# Patient Record
Sex: FEMALE | Race: WHITE | HISPANIC OR LATINO | Employment: PART TIME | URBAN - METROPOLITAN AREA
[De-identification: names, ages, dates, MRNs, and addresses within clinical notes are randomized per-mention and may not be internally consistent; named-entity substitution may affect disease eponyms.]

---

## 2023-05-04 PROBLEM — R60.0 PEDAL EDEMA: Status: ACTIVE | Noted: 2023-05-04

## 2023-05-04 PROBLEM — M54.50 LOW BACK PAIN: Status: ACTIVE | Noted: 2023-05-04

## 2023-05-04 PROBLEM — M25.562 LEFT KNEE PAIN: Status: ACTIVE | Noted: 2023-05-04

## 2023-05-04 PROBLEM — S83.241A TEAR OF MEDIAL MENISCUS OF RIGHT KNEE, CURRENT: Status: ACTIVE | Noted: 2023-05-04

## 2023-05-04 PROBLEM — F32.A DEPRESSION: Status: ACTIVE | Noted: 2023-05-04

## 2023-05-04 PROBLEM — K21.9 GERD (GASTROESOPHAGEAL REFLUX DISEASE): Status: ACTIVE | Noted: 2023-05-04

## 2023-05-04 PROBLEM — S89.92XA LEFT KNEE INJURY, INITIAL ENCOUNTER: Status: ACTIVE | Noted: 2023-05-04

## 2023-05-04 PROBLEM — M47.816 LUMBAR SPONDYLOSIS: Status: ACTIVE | Noted: 2023-05-04

## 2023-05-04 PROBLEM — I10 HTN (HYPERTENSION): Status: ACTIVE | Noted: 2023-05-04

## 2023-05-04 PROBLEM — E66.9 OBESITY: Status: ACTIVE | Noted: 2023-05-04

## 2023-05-04 PROBLEM — R00.2 PALPITATIONS: Status: ACTIVE | Noted: 2023-05-04

## 2023-05-04 RX ORDER — VENLAFAXINE HYDROCHLORIDE 75 MG/1
1 CAPSULE, EXTENDED RELEASE ORAL DAILY
COMMUNITY
Start: 2020-01-09 | End: 2023-05-18 | Stop reason: ALTCHOICE

## 2023-05-04 RX ORDER — FUROSEMIDE 20 MG/1
1 TABLET ORAL DAILY
COMMUNITY
Start: 2020-05-14 | End: 2023-05-18 | Stop reason: ALTCHOICE

## 2023-05-04 RX ORDER — OMEPRAZOLE 20 MG/1
1 CAPSULE, DELAYED RELEASE ORAL DAILY
COMMUNITY
Start: 2020-01-09 | End: 2023-05-18 | Stop reason: ALTCHOICE

## 2023-05-04 RX ORDER — HYDROCHLOROTHIAZIDE 25 MG/1
TABLET ORAL
COMMUNITY
Start: 2020-01-09 | End: 2023-05-18 | Stop reason: ALTCHOICE

## 2023-05-04 RX ORDER — MELOXICAM 15 MG/1
TABLET ORAL
COMMUNITY
Start: 2021-10-08 | End: 2023-05-18 | Stop reason: ALTCHOICE

## 2023-05-04 RX ORDER — HYDROCODONE BITARTRATE AND ACETAMINOPHEN 5; 325 MG/1; MG/1
TABLET ORAL EVERY 6 HOURS PRN
COMMUNITY
Start: 2021-01-25 | End: 2023-05-18 | Stop reason: ALTCHOICE

## 2023-05-18 ENCOUNTER — OFFICE VISIT (OUTPATIENT)
Dept: PRIMARY CARE | Facility: CLINIC | Age: 48
End: 2023-05-18
Payer: COMMERCIAL

## 2023-05-18 VITALS
DIASTOLIC BLOOD PRESSURE: 83 MMHG | BODY MASS INDEX: 41.81 KG/M2 | OXYGEN SATURATION: 96 % | SYSTOLIC BLOOD PRESSURE: 138 MMHG | HEART RATE: 75 BPM | WEIGHT: 236 LBS

## 2023-05-18 DIAGNOSIS — I10 PRIMARY HYPERTENSION: ICD-10-CM

## 2023-05-18 DIAGNOSIS — E66.01 CLASS 3 SEVERE OBESITY WITHOUT SERIOUS COMORBIDITY WITH BODY MASS INDEX (BMI) OF 40.0 TO 44.9 IN ADULT, UNSPECIFIED OBESITY TYPE (MULTI): ICD-10-CM

## 2023-05-18 DIAGNOSIS — Z86.39 HISTORY OF HYPERLIPIDEMIA: ICD-10-CM

## 2023-05-18 DIAGNOSIS — M79.89 LEG SWELLING: Primary | ICD-10-CM

## 2023-05-18 DIAGNOSIS — R60.0 PEDAL EDEMA: ICD-10-CM

## 2023-05-18 PROCEDURE — 3008F BODY MASS INDEX DOCD: CPT | Performed by: STUDENT IN AN ORGANIZED HEALTH CARE EDUCATION/TRAINING PROGRAM

## 2023-05-18 PROCEDURE — 3079F DIAST BP 80-89 MM HG: CPT | Performed by: STUDENT IN AN ORGANIZED HEALTH CARE EDUCATION/TRAINING PROGRAM

## 2023-05-18 PROCEDURE — 99203 OFFICE O/P NEW LOW 30 MIN: CPT | Performed by: STUDENT IN AN ORGANIZED HEALTH CARE EDUCATION/TRAINING PROGRAM

## 2023-05-18 PROCEDURE — 3075F SYST BP GE 130 - 139MM HG: CPT | Performed by: STUDENT IN AN ORGANIZED HEALTH CARE EDUCATION/TRAINING PROGRAM

## 2023-05-18 RX ORDER — HYDROCHLOROTHIAZIDE 12.5 MG/1
12.5 TABLET ORAL DAILY
Qty: 30 TABLET | Refills: 1 | Status: SHIPPED | OUTPATIENT
Start: 2023-05-18 | End: 2023-06-13

## 2023-05-18 ASSESSMENT — ENCOUNTER SYMPTOMS
VOMITING: 0
UNEXPECTED WEIGHT CHANGE: 0
SHORTNESS OF BREATH: 0
CONSTIPATION: 0
NAUSEA: 0
ABDOMINAL PAIN: 0
COLOR CHANGE: 0
CHILLS: 0
MUSCULOSKELETAL NEGATIVE: 1
COUGH: 0
CONFUSION: 0
PALPITATIONS: 0
DIARRHEA: 0
DIZZINESS: 0
HEADACHES: 0
WHEEZING: 0
FATIGUE: 0
FEVER: 0

## 2023-05-18 ASSESSMENT — PAIN SCALES - GENERAL: PAINLEVEL: 0-NO PAIN

## 2023-05-18 ASSESSMENT — PATIENT HEALTH QUESTIONNAIRE - PHQ9
10. IF YOU CHECKED OFF ANY PROBLEMS, HOW DIFFICULT HAVE THESE PROBLEMS MADE IT FOR YOU TO DO YOUR WORK, TAKE CARE OF THINGS AT HOME, OR GET ALONG WITH OTHER PEOPLE: NOT DIFFICULT AT ALL
SUM OF ALL RESPONSES TO PHQ9 QUESTIONS 1 AND 2: 1
1. LITTLE INTEREST OR PLEASURE IN DOING THINGS: NOT AT ALL
2. FEELING DOWN, DEPRESSED OR HOPELESS: SEVERAL DAYS

## 2023-05-18 NOTE — PROGRESS NOTES
Subjective   Patient ID: Princess Long is a 48 y.o. female who presents for New Patient Visit (Pt is here for Npv, would like to estab care, seen Rylee before. Pt says she swells this was addressed before but didn't figure out the cause of the swelling, mostly in the left leg, pt has picture of the feet swelling.). LOV 3 yrs ago.     HPI   She is here to establish care and also complaining of bilateral leg swelling.  Reports she has been having on and off lower extremity edema for over a year, had some work-up in the past with normal results.  She denies calf pain, pain at the popliteal site and previous history of blood clots.  She also denies chest pain/shortness of breath, abdominal pain, nausea vomiting, cough and other associated symptoms.  Reports she has a history of hypertension, previously was not HCTZ but stopped as she was unable to see the doctor over 3 years.    Review of Systems   Constitutional:  Negative for chills, fatigue, fever and unexpected weight change.   HENT: Negative.     Respiratory:  Negative for cough, shortness of breath and wheezing.    Cardiovascular:  Positive for leg swelling. Negative for chest pain and palpitations.   Gastrointestinal:  Negative for abdominal pain, constipation, diarrhea, nausea and vomiting.   Musculoskeletal: Negative.    Skin:  Negative for color change and rash.   Neurological:  Negative for dizziness and headaches.   Psychiatric/Behavioral:  Negative for behavioral problems and confusion.        Objective   /83 (BP Location: Left arm, Patient Position: Sitting, BP Cuff Size: Adult)   Pulse 75   Wt 107 kg (236 lb)   SpO2 96%   BMI 41.81 kg/m²     Physical Exam  Vitals and nursing note reviewed.   Constitutional:       Appearance: Normal appearance. She is obese.   Cardiovascular:      Rate and Rhythm: Normal rate and regular rhythm.      Pulses: Normal pulses.      Heart sounds: Normal heart sounds.   Pulmonary:      Effort: Pulmonary effort is  normal. No respiratory distress.      Breath sounds: Normal breath sounds. No wheezing.   Abdominal:      General: Abdomen is flat. Bowel sounds are normal.      Palpations: Abdomen is soft.   Musculoskeletal:         General: Normal range of motion.      Right lower leg: Edema present.      Left lower leg: Edema present.      Comments: Has trace-1+ pitting edema bl up to the mid shin. Not tttp at the calf/popliteal are. Gait normal.    Neurological:      General: No focal deficit present.      Mental Status: She is alert and oriented to person, place, and time.   Psychiatric:         Mood and Affect: Mood normal.         Behavior: Behavior normal.       Assessment/Plan   She is here to establish care and also complaining of bilateral leg swelling.  Appears that this is a having intermittent bilateral leg edema for over a years, no calf/popliteal area pain and also no dyspnea on exertion.  She likely has dependent edema versus other etiologies as cardiac/renal, although less likely in absence of other symptoms.  We will obtain blood work as CBC, CMP, TSH, BNP and others as listed below to eval further. Pending BW result for further mgmt.   We will restart back on hydrochlorothiazide for HTN; start 12.5 mg daily and may help with swelling as well. Optimize as indicated at NOV. Rec dash diet, heart healthy diet. Also put referral to see nutritionist for obesity mgmt. May consider adding wt loss meds at NOV if interested. Otherwise clinically stable.   Problem List Items Addressed This Visit          Circulatory    HTN (hypertension)    Relevant Medications    hydroCHLOROthiazide (HYDRODiuril) 12.5 mg tablet    Other Relevant Orders    Comprehensive Metabolic Panel       Musculoskeletal    Pedal edema       Endocrine/Metabolic    Obesity    Relevant Orders    Comprehensive Metabolic Panel    Referral to Nutrition Services     Other Visit Diagnoses       Leg swelling    -  Primary    Relevant Orders    CBC     Comprehensive Metabolic Panel    Tsh With Reflex To Free T4 If Abnormal    B-type natriuretic peptide    History of hyperlipidemia        Relevant Orders    Lipid Panel          Rtc 3 mo for physical/FU.    Jesus Cabello MD    [facility]

## 2023-05-25 ENCOUNTER — LAB (OUTPATIENT)
Dept: LAB | Facility: LAB | Age: 48
End: 2023-05-25
Payer: COMMERCIAL

## 2023-05-25 DIAGNOSIS — Z86.39 HISTORY OF HYPERLIPIDEMIA: ICD-10-CM

## 2023-05-25 DIAGNOSIS — M79.89 LEG SWELLING: ICD-10-CM

## 2023-05-25 DIAGNOSIS — I10 PRIMARY HYPERTENSION: ICD-10-CM

## 2023-05-25 DIAGNOSIS — E66.01 CLASS 3 SEVERE OBESITY WITHOUT SERIOUS COMORBIDITY WITH BODY MASS INDEX (BMI) OF 40.0 TO 44.9 IN ADULT, UNSPECIFIED OBESITY TYPE (MULTI): ICD-10-CM

## 2023-05-25 LAB
ALANINE AMINOTRANSFERASE (SGPT) (U/L) IN SER/PLAS: 34 U/L (ref 7–45)
ALBUMIN (G/DL) IN SER/PLAS: 4.2 G/DL (ref 3.4–5)
ALKALINE PHOSPHATASE (U/L) IN SER/PLAS: 61 U/L (ref 33–110)
ANION GAP IN SER/PLAS: 13 MMOL/L (ref 10–20)
ASPARTATE AMINOTRANSFERASE (SGOT) (U/L) IN SER/PLAS: 39 U/L (ref 9–39)
BILIRUBIN TOTAL (MG/DL) IN SER/PLAS: 0.5 MG/DL (ref 0–1.2)
CALCIUM (MG/DL) IN SER/PLAS: 9.3 MG/DL (ref 8.6–10.3)
CARBON DIOXIDE, TOTAL (MMOL/L) IN SER/PLAS: 26 MMOL/L (ref 21–32)
CHLORIDE (MMOL/L) IN SER/PLAS: 104 MMOL/L (ref 98–107)
CHOLESTEROL (MG/DL) IN SER/PLAS: 197 MG/DL (ref 0–199)
CHOLESTEROL IN HDL (MG/DL) IN SER/PLAS: 61.1 MG/DL
CHOLESTEROL/HDL RATIO: 3.2
CREATININE (MG/DL) IN SER/PLAS: 0.69 MG/DL (ref 0.5–1.05)
ERYTHROCYTE DISTRIBUTION WIDTH (RATIO) BY AUTOMATED COUNT: 12.5 % (ref 11.5–14.5)
ERYTHROCYTE MEAN CORPUSCULAR HEMOGLOBIN CONCENTRATION (G/DL) BY AUTOMATED: 32 G/DL (ref 32–36)
ERYTHROCYTE MEAN CORPUSCULAR VOLUME (FL) BY AUTOMATED COUNT: 94 FL (ref 80–100)
ERYTHROCYTES (10*6/UL) IN BLOOD BY AUTOMATED COUNT: 4.6 X10E12/L (ref 4–5.2)
GFR FEMALE: >90 ML/MIN/1.73M2
GLUCOSE (MG/DL) IN SER/PLAS: 109 MG/DL (ref 74–99)
HEMATOCRIT (%) IN BLOOD BY AUTOMATED COUNT: 43.1 % (ref 36–46)
HEMOGLOBIN (G/DL) IN BLOOD: 13.8 G/DL (ref 12–16)
LDL: 113 MG/DL (ref 0–99)
LEUKOCYTES (10*3/UL) IN BLOOD BY AUTOMATED COUNT: 6.5 X10E9/L (ref 4.4–11.3)
NATRIURETIC PEPTIDE B (PG/ML) IN SER/PLAS: 27 PG/ML (ref 0–99)
PLATELETS (10*3/UL) IN BLOOD AUTOMATED COUNT: 279 X10E9/L (ref 150–450)
POTASSIUM (MMOL/L) IN SER/PLAS: 3.8 MMOL/L (ref 3.5–5.3)
PROTEIN TOTAL: 7 G/DL (ref 6.4–8.2)
SODIUM (MMOL/L) IN SER/PLAS: 139 MMOL/L (ref 136–145)
THYROTROPIN (MIU/L) IN SER/PLAS BY DETECTION LIMIT <= 0.05 MIU/L: 3.53 MIU/L (ref 0.44–3.98)
TRIGLYCERIDE (MG/DL) IN SER/PLAS: 117 MG/DL (ref 0–149)
UREA NITROGEN (MG/DL) IN SER/PLAS: 12 MG/DL (ref 6–23)
VLDL: 23 MG/DL (ref 0–40)

## 2023-05-25 PROCEDURE — 83880 ASSAY OF NATRIURETIC PEPTIDE: CPT

## 2023-05-25 PROCEDURE — 84443 ASSAY THYROID STIM HORMONE: CPT

## 2023-05-25 PROCEDURE — 36415 COLL VENOUS BLD VENIPUNCTURE: CPT

## 2023-05-25 PROCEDURE — 80061 LIPID PANEL: CPT

## 2023-05-25 PROCEDURE — 80053 COMPREHEN METABOLIC PANEL: CPT

## 2023-05-25 PROCEDURE — 85027 COMPLETE CBC AUTOMATED: CPT

## 2023-05-30 ENCOUNTER — TELEPHONE (OUTPATIENT)
Dept: PRIMARY CARE | Facility: CLINIC | Age: 48
End: 2023-05-30
Payer: COMMERCIAL

## 2023-05-30 NOTE — TELEPHONE ENCOUNTER
Pt states she has not started the hydrochlorothiazide yet because she read the pamphlet that came with it.  It states she should not be in the sun while taking.  Pt works in the sun all day every day and wonders if you have any advise about this.

## 2023-05-30 NOTE — TELEPHONE ENCOUNTER
PATIENT CALLED BACK/ READ DR CLARK MESSAGE/     PATIENT UNDERSTANDS, AND WILL CALL BACK IF THERE IS ANY CONCERNS AFTER STARTING THE MEDS AS PRESCRIBED AS

## 2023-06-13 DIAGNOSIS — I10 PRIMARY HYPERTENSION: Primary | ICD-10-CM

## 2023-06-13 PROBLEM — S71.112A: Status: ACTIVE | Noted: 2023-06-13

## 2023-06-13 RX ORDER — OMEPRAZOLE 20 MG/1
1 CAPSULE, DELAYED RELEASE ORAL DAILY
COMMUNITY
Start: 2020-01-09 | End: 2023-07-06 | Stop reason: ENTERED-IN-ERROR

## 2023-06-13 RX ORDER — HYDROCHLOROTHIAZIDE 12.5 MG/1
TABLET ORAL
Qty: 30 TABLET | Refills: 1 | Status: SHIPPED | OUTPATIENT
Start: 2023-06-13 | End: 2023-07-06 | Stop reason: SDUPTHER

## 2023-06-13 RX ORDER — FUROSEMIDE 20 MG/1
1 TABLET ORAL DAILY
COMMUNITY
Start: 2020-05-14 | End: 2023-07-06 | Stop reason: ALTCHOICE

## 2023-06-13 RX ORDER — VENLAFAXINE HYDROCHLORIDE 75 MG/1
1 CAPSULE, EXTENDED RELEASE ORAL DAILY
COMMUNITY
Start: 2020-01-09 | End: 2023-07-06 | Stop reason: ALTCHOICE

## 2023-06-13 RX ORDER — MELOXICAM 15 MG/1
TABLET ORAL
COMMUNITY
Start: 2021-10-08 | End: 2023-07-06 | Stop reason: ALTCHOICE

## 2023-07-06 ENCOUNTER — OFFICE VISIT (OUTPATIENT)
Dept: PRIMARY CARE | Facility: CLINIC | Age: 48
End: 2023-07-06
Payer: COMMERCIAL

## 2023-07-06 VITALS
SYSTOLIC BLOOD PRESSURE: 150 MMHG | RESPIRATION RATE: 16 BRPM | HEART RATE: 84 BPM | BODY MASS INDEX: 41.46 KG/M2 | OXYGEN SATURATION: 97 % | DIASTOLIC BLOOD PRESSURE: 94 MMHG | TEMPERATURE: 97.5 F | HEIGHT: 63 IN | WEIGHT: 234 LBS

## 2023-07-06 DIAGNOSIS — I10 PRIMARY HYPERTENSION: ICD-10-CM

## 2023-07-06 DIAGNOSIS — K21.9 GASTROESOPHAGEAL REFLUX DISEASE WITHOUT ESOPHAGITIS: ICD-10-CM

## 2023-07-06 DIAGNOSIS — M79.89 LEG SWELLING: Primary | ICD-10-CM

## 2023-07-06 DIAGNOSIS — R60.0 PEDAL EDEMA: ICD-10-CM

## 2023-07-06 PROCEDURE — 3080F DIAST BP >= 90 MM HG: CPT | Performed by: STUDENT IN AN ORGANIZED HEALTH CARE EDUCATION/TRAINING PROGRAM

## 2023-07-06 PROCEDURE — 99214 OFFICE O/P EST MOD 30 MIN: CPT | Performed by: STUDENT IN AN ORGANIZED HEALTH CARE EDUCATION/TRAINING PROGRAM

## 2023-07-06 PROCEDURE — 3077F SYST BP >= 140 MM HG: CPT | Performed by: STUDENT IN AN ORGANIZED HEALTH CARE EDUCATION/TRAINING PROGRAM

## 2023-07-06 PROCEDURE — 3008F BODY MASS INDEX DOCD: CPT | Performed by: STUDENT IN AN ORGANIZED HEALTH CARE EDUCATION/TRAINING PROGRAM

## 2023-07-06 RX ORDER — OMEPRAZOLE 20 MG/1
20 CAPSULE, DELAYED RELEASE ORAL
Qty: 30 CAPSULE | Refills: 5 | Status: SHIPPED | OUTPATIENT
Start: 2023-07-06 | End: 2024-01-02

## 2023-07-06 RX ORDER — HYDROCHLOROTHIAZIDE 25 MG/1
25 TABLET ORAL DAILY
Qty: 90 TABLET | Refills: 1 | Status: SHIPPED | OUTPATIENT
Start: 2023-07-06 | End: 2024-01-25

## 2023-07-06 ASSESSMENT — LIFESTYLE VARIABLES
HOW OFTEN DO YOU HAVE A DRINK CONTAINING ALCOHOL: NEVER
HOW MANY STANDARD DRINKS CONTAINING ALCOHOL DO YOU HAVE ON A TYPICAL DAY: PATIENT DOES NOT DRINK
HOW OFTEN DO YOU HAVE SIX OR MORE DRINKS ON ONE OCCASION: NEVER
AUDIT-C TOTAL SCORE: 0
SKIP TO QUESTIONS 9-10: 1

## 2023-07-06 ASSESSMENT — ENCOUNTER SYMPTOMS
COUGH: 0
SHORTNESS OF BREATH: 0
HEADACHES: 0
MUSCULOSKELETAL NEGATIVE: 1
CONSTIPATION: 0
FATIGUE: 0
NAUSEA: 0
WHEEZING: 0
VOMITING: 0
DIARRHEA: 0
CONFUSION: 0
FEVER: 0
ABDOMINAL PAIN: 0
DIZZINESS: 0
PALPITATIONS: 0
CHILLS: 0
UNEXPECTED WEIGHT CHANGE: 0
COLOR CHANGE: 0

## 2023-07-06 NOTE — PROGRESS NOTES
"Subjective   Patient ID: Porfirio Long is a 48 y.o. female who presents for Follow-up, Hypertension, and Edema (Bilateral leg swelling.).    HPI   She is here for FU visit. Reports she is doing well, no acute issues today except continues to have bl leg swelling. Denies pain on her calf, CP/SOB, palpitation, HA, cough and other asso sx. Reports on & off swelling for quite some times. Recent BW (05/25/23) lipid, BNP, TSH, CMP & CBC were all wnl.     # HTN   - io BP was 150/94   - takes hydrochlorothiazide 12.5 mg daily     # GERD   - reports stable sx on PPI 20 mg daily.     Review of Systems   Constitutional:  Negative for chills, fatigue, fever and unexpected weight change.   HENT: Negative.     Respiratory:  Negative for cough, shortness of breath and wheezing.    Cardiovascular:  Positive for leg swelling. Negative for chest pain and palpitations.   Gastrointestinal:  Negative for abdominal pain, constipation, diarrhea, nausea and vomiting.   Musculoskeletal: Negative.    Skin:  Negative for color change and rash.   Neurological:  Negative for dizziness and headaches.   Psychiatric/Behavioral:  Negative for behavioral problems and confusion.        Objective   BP (!) 150/94 (BP Location: Right arm, Patient Position: Sitting, BP Cuff Size: Large adult)   Pulse 84   Temp 36.4 °C (97.5 °F)   Resp 16   Ht 1.6 m (5' 3\")   Wt 106 kg (234 lb)   SpO2 97%   BMI 41.45 kg/m²     Physical Exam  Vitals and nursing note reviewed.   Constitutional:       Appearance: Normal appearance. She is obese.   Cardiovascular:      Rate and Rhythm: Normal rate and regular rhythm.      Pulses: Normal pulses.      Heart sounds: Normal heart sounds.   Pulmonary:      Effort: Pulmonary effort is normal. No respiratory distress.      Breath sounds: Normal breath sounds.   Abdominal:      General: Abdomen is flat. Bowel sounds are normal.      Palpations: Abdomen is soft.   Musculoskeletal:         General: Normal range of motion.      " Right lower leg: Edema present.      Left lower leg: Edema present.   Neurological:      General: No focal deficit present.      Mental Status: She is alert and oriented to person, place, and time.   Psychiatric:         Mood and Affect: Mood normal.         Behavior: Behavior normal.       Assessment/Plan   She is here for FU visit. She is having ongoing bl leg swelling; had normal BW recently (05/25/23) as listed above. Likely dependent edema vs other vascular issues; will obtain bl duplex US of LE; also place referral to see VS post US result. HTN remains sl under control; will go up on hydrochlorothiazide to 25 mg daily which may help with swelling too. GERD been stable, cont PPI as listed below.    Problem List Items Addressed This Visit          Cardiac and Vasculature    HTN (hypertension)    Relevant Medications    hydroCHLOROthiazide (HYDRODiuril) 25 mg tablet       Gastrointestinal and Abdominal    GERD (gastroesophageal reflux disease)    Relevant Medications    omeprazole (PriLOSEC) 20 mg DR capsule       Symptoms and Signs    Pedal edema    Relevant Orders    Vascular US lower extremity venous duplex bilateral     Other Visit Diagnoses       Leg swelling    -  Primary    Relevant Orders    Vascular US lower extremity venous duplex bilateral    Referral to Vascular Surgery          Rtc 2 mo for HTN/other FU   Jesus Cabello MD    [facility]

## 2023-09-19 ENCOUNTER — APPOINTMENT (OUTPATIENT)
Dept: PRIMARY CARE | Facility: CLINIC | Age: 48
End: 2023-09-19
Payer: COMMERCIAL

## 2023-10-10 ENCOUNTER — APPOINTMENT (OUTPATIENT)
Dept: PRIMARY CARE | Facility: CLINIC | Age: 48
End: 2023-10-10
Payer: COMMERCIAL

## 2024-01-24 ENCOUNTER — TELEPHONE (OUTPATIENT)
Dept: PRIMARY CARE | Facility: CLINIC | Age: 49
End: 2024-01-24

## 2024-01-24 DIAGNOSIS — I10 PRIMARY HYPERTENSION: ICD-10-CM

## 2024-01-25 RX ORDER — HYDROCHLOROTHIAZIDE 25 MG/1
25 TABLET ORAL DAILY
Qty: 30 TABLET | Refills: 1 | Status: SHIPPED | OUTPATIENT
Start: 2024-01-25 | End: 2024-02-19

## 2024-02-17 DIAGNOSIS — I10 PRIMARY HYPERTENSION: ICD-10-CM

## 2024-02-19 RX ORDER — HYDROCHLOROTHIAZIDE 25 MG/1
25 TABLET ORAL DAILY
Qty: 90 TABLET | Refills: 0 | Status: SHIPPED | OUTPATIENT
Start: 2024-02-19

## 2024-03-14 ENCOUNTER — APPOINTMENT (OUTPATIENT)
Dept: PRIMARY CARE | Facility: CLINIC | Age: 49
End: 2024-03-14
Payer: COMMERCIAL

## 2024-03-14 PROBLEM — S71.112A: Status: RESOLVED | Noted: 2023-06-13 | Resolved: 2024-03-14

## 2024-03-14 PROBLEM — F32.A DEPRESSION: Status: RESOLVED | Noted: 2023-05-04 | Resolved: 2024-03-14

## 2024-03-14 PROBLEM — S83.241A TEAR OF MEDIAL MENISCUS OF RIGHT KNEE, CURRENT: Status: RESOLVED | Noted: 2023-05-04 | Resolved: 2024-03-14

## 2024-03-14 PROBLEM — S89.92XA LEFT KNEE INJURY, INITIAL ENCOUNTER: Status: RESOLVED | Noted: 2023-05-04 | Resolved: 2024-03-14

## 2024-03-14 PROBLEM — M25.562 LEFT KNEE PAIN: Status: RESOLVED | Noted: 2023-05-04 | Resolved: 2024-03-14

## 2025-04-01 ENCOUNTER — APPOINTMENT (OUTPATIENT)
Dept: RADIOLOGY | Facility: HOSPITAL | Age: 50
End: 2025-04-01

## 2025-04-01 ENCOUNTER — HOSPITAL ENCOUNTER (EMERGENCY)
Facility: HOSPITAL | Age: 50
Discharge: HOME | End: 2025-04-01
Attending: EMERGENCY MEDICINE

## 2025-04-01 VITALS
WEIGHT: 235 LBS | DIASTOLIC BLOOD PRESSURE: 99 MMHG | RESPIRATION RATE: 20 BRPM | HEIGHT: 63 IN | HEART RATE: 76 BPM | BODY MASS INDEX: 41.64 KG/M2 | OXYGEN SATURATION: 98 % | TEMPERATURE: 97.6 F | SYSTOLIC BLOOD PRESSURE: 145 MMHG

## 2025-04-01 DIAGNOSIS — R10.11 RIGHT UPPER QUADRANT ABDOMINAL PAIN: Primary | ICD-10-CM

## 2025-04-01 LAB
ALBUMIN SERPL BCP-MCNC: 4.1 G/DL (ref 3.4–5)
ALP SERPL-CCNC: 51 U/L (ref 33–110)
ALT SERPL W P-5'-P-CCNC: 35 U/L (ref 7–45)
ANION GAP SERPL CALC-SCNC: 12 MMOL/L (ref 10–20)
APPEARANCE UR: CLEAR
AST SERPL W P-5'-P-CCNC: 31 U/L (ref 9–39)
BASOPHILS # BLD AUTO: 0.02 X10*3/UL (ref 0–0.1)
BASOPHILS NFR BLD AUTO: 0.3 %
BILIRUB DIRECT SERPL-MCNC: 0.1 MG/DL (ref 0–0.3)
BILIRUB SERPL-MCNC: 0.6 MG/DL (ref 0–1.2)
BILIRUB UR STRIP.AUTO-MCNC: NEGATIVE MG/DL
BUN SERPL-MCNC: 7 MG/DL (ref 6–23)
CALCIUM SERPL-MCNC: 9.1 MG/DL (ref 8.6–10.3)
CHLORIDE SERPL-SCNC: 105 MMOL/L (ref 98–107)
CO2 SERPL-SCNC: 26 MMOL/L (ref 21–32)
COLOR UR: NORMAL
CREAT SERPL-MCNC: 0.6 MG/DL (ref 0.5–1.05)
EGFRCR SERPLBLD CKD-EPI 2021: >90 ML/MIN/1.73M*2
EOSINOPHIL # BLD AUTO: 0.12 X10*3/UL (ref 0–0.7)
EOSINOPHIL NFR BLD AUTO: 1.6 %
ERYTHROCYTE [DISTWIDTH] IN BLOOD BY AUTOMATED COUNT: 12.2 % (ref 11.5–14.5)
GLUCOSE SERPL-MCNC: 144 MG/DL (ref 74–99)
GLUCOSE UR STRIP.AUTO-MCNC: NORMAL MG/DL
HCT VFR BLD AUTO: 43.6 % (ref 36–46)
HGB BLD-MCNC: 14.5 G/DL (ref 12–16)
IMM GRANULOCYTES # BLD AUTO: 0.02 X10*3/UL (ref 0–0.7)
IMM GRANULOCYTES NFR BLD AUTO: 0.3 % (ref 0–0.9)
KETONES UR STRIP.AUTO-MCNC: NEGATIVE MG/DL
LACTATE SERPL-SCNC: 1.4 MMOL/L (ref 0.4–2)
LEUKOCYTE ESTERASE UR QL STRIP.AUTO: NEGATIVE
LIPASE SERPL-CCNC: 34 U/L (ref 9–82)
LYMPHOCYTES # BLD AUTO: 2.37 X10*3/UL (ref 1.2–4.8)
LYMPHOCYTES NFR BLD AUTO: 31.3 %
MCH RBC QN AUTO: 29.7 PG (ref 26–34)
MCHC RBC AUTO-ENTMCNC: 33.3 G/DL (ref 32–36)
MCV RBC AUTO: 89 FL (ref 80–100)
MONOCYTES # BLD AUTO: 0.43 X10*3/UL (ref 0.1–1)
MONOCYTES NFR BLD AUTO: 5.7 %
NEUTROPHILS # BLD AUTO: 4.62 X10*3/UL (ref 1.2–7.7)
NEUTROPHILS NFR BLD AUTO: 60.8 %
NITRITE UR QL STRIP.AUTO: NEGATIVE
NRBC BLD-RTO: 0 /100 WBCS (ref 0–0)
PH UR STRIP.AUTO: 6 [PH]
PLATELET # BLD AUTO: 285 X10*3/UL (ref 150–450)
POTASSIUM SERPL-SCNC: 4 MMOL/L (ref 3.5–5.3)
PROT SERPL-MCNC: 6.9 G/DL (ref 6.4–8.2)
PROT UR STRIP.AUTO-MCNC: NEGATIVE MG/DL
RBC # BLD AUTO: 4.89 X10*6/UL (ref 4–5.2)
RBC # UR STRIP.AUTO: NEGATIVE MG/DL
SODIUM SERPL-SCNC: 139 MMOL/L (ref 136–145)
SP GR UR STRIP.AUTO: 1.02
UROBILINOGEN UR STRIP.AUTO-MCNC: NORMAL MG/DL
WBC # BLD AUTO: 7.6 X10*3/UL (ref 4.4–11.3)

## 2025-04-01 PROCEDURE — 2550000001 HC RX 255 CONTRASTS: Performed by: EMERGENCY MEDICINE

## 2025-04-01 PROCEDURE — 96361 HYDRATE IV INFUSION ADD-ON: CPT

## 2025-04-01 PROCEDURE — 76705 ECHO EXAM OF ABDOMEN: CPT

## 2025-04-01 PROCEDURE — 81003 URINALYSIS AUTO W/O SCOPE: CPT | Performed by: EMERGENCY MEDICINE

## 2025-04-01 PROCEDURE — 99285 EMERGENCY DEPT VISIT HI MDM: CPT | Mod: 25 | Performed by: EMERGENCY MEDICINE

## 2025-04-01 PROCEDURE — 96375 TX/PRO/DX INJ NEW DRUG ADDON: CPT

## 2025-04-01 PROCEDURE — 2500000004 HC RX 250 GENERAL PHARMACY W/ HCPCS (ALT 636 FOR OP/ED): Mod: JZ | Performed by: EMERGENCY MEDICINE

## 2025-04-01 PROCEDURE — 83690 ASSAY OF LIPASE: CPT | Performed by: EMERGENCY MEDICINE

## 2025-04-01 PROCEDURE — 82248 BILIRUBIN DIRECT: CPT | Performed by: EMERGENCY MEDICINE

## 2025-04-01 PROCEDURE — 96374 THER/PROPH/DIAG INJ IV PUSH: CPT | Mod: 59

## 2025-04-01 PROCEDURE — 80051 ELECTROLYTE PANEL: CPT | Performed by: EMERGENCY MEDICINE

## 2025-04-01 PROCEDURE — 76705 ECHO EXAM OF ABDOMEN: CPT | Performed by: RADIOLOGY

## 2025-04-01 PROCEDURE — 83605 ASSAY OF LACTIC ACID: CPT | Performed by: EMERGENCY MEDICINE

## 2025-04-01 PROCEDURE — 36415 COLL VENOUS BLD VENIPUNCTURE: CPT | Performed by: EMERGENCY MEDICINE

## 2025-04-01 PROCEDURE — 85025 COMPLETE CBC W/AUTO DIFF WBC: CPT | Performed by: EMERGENCY MEDICINE

## 2025-04-01 PROCEDURE — 74177 CT ABD & PELVIS W/CONTRAST: CPT

## 2025-04-01 RX ORDER — ONDANSETRON HYDROCHLORIDE 2 MG/ML
4 INJECTION, SOLUTION INTRAVENOUS ONCE
Status: COMPLETED | OUTPATIENT
Start: 2025-04-01 | End: 2025-04-01

## 2025-04-01 RX ORDER — KETOROLAC TROMETHAMINE 30 MG/ML
30 INJECTION, SOLUTION INTRAMUSCULAR; INTRAVENOUS ONCE
Status: COMPLETED | OUTPATIENT
Start: 2025-04-01 | End: 2025-04-01

## 2025-04-01 RX ADMIN — SODIUM CHLORIDE 1000 ML: 0.9 INJECTION, SOLUTION INTRAVENOUS at 14:35

## 2025-04-01 RX ADMIN — IOHEXOL 75 ML: 350 INJECTION, SOLUTION INTRAVENOUS at 17:19

## 2025-04-01 RX ADMIN — HYDROMORPHONE HYDROCHLORIDE 0.5 MG: 0.5 INJECTION, SOLUTION INTRAMUSCULAR; INTRAVENOUS; SUBCUTANEOUS at 14:40

## 2025-04-01 RX ADMIN — KETOROLAC TROMETHAMINE 30 MG: 30 INJECTION, SOLUTION INTRAMUSCULAR at 14:40

## 2025-04-01 RX ADMIN — ONDANSETRON 4 MG: 2 INJECTION INTRAMUSCULAR; INTRAVENOUS at 14:40

## 2025-04-01 ASSESSMENT — LIFESTYLE VARIABLES
EVER FELT BAD OR GUILTY ABOUT YOUR DRINKING: NO
HAVE PEOPLE ANNOYED YOU BY CRITICIZING YOUR DRINKING: NO
TOTAL SCORE: 0
HAVE YOU EVER FELT YOU SHOULD CUT DOWN ON YOUR DRINKING: NO
EVER HAD A DRINK FIRST THING IN THE MORNING TO STEADY YOUR NERVES TO GET RID OF A HANGOVER: NO

## 2025-04-01 ASSESSMENT — COLUMBIA-SUICIDE SEVERITY RATING SCALE - C-SSRS
2. HAVE YOU ACTUALLY HAD ANY THOUGHTS OF KILLING YOURSELF?: NO
1. IN THE PAST MONTH, HAVE YOU WISHED YOU WERE DEAD OR WISHED YOU COULD GO TO SLEEP AND NOT WAKE UP?: NO
6. HAVE YOU EVER DONE ANYTHING, STARTED TO DO ANYTHING, OR PREPARED TO DO ANYTHING TO END YOUR LIFE?: NO

## 2025-04-01 ASSESSMENT — PAIN DESCRIPTION - ORIENTATION: ORIENTATION: RIGHT;UPPER

## 2025-04-01 ASSESSMENT — PAIN DESCRIPTION - PAIN TYPE: TYPE: ACUTE PAIN

## 2025-04-01 ASSESSMENT — PAIN - FUNCTIONAL ASSESSMENT: PAIN_FUNCTIONAL_ASSESSMENT: 0-10

## 2025-04-01 ASSESSMENT — PAIN SCALES - GENERAL: PAINLEVEL_OUTOF10: 10 - WORST POSSIBLE PAIN

## 2025-04-01 ASSESSMENT — PAIN DESCRIPTION - LOCATION: LOCATION: ABDOMEN

## 2025-04-01 ASSESSMENT — PAIN DESCRIPTION - DESCRIPTORS: DESCRIPTORS: SHARP

## 2025-04-01 NOTE — ED TRIAGE NOTES
Patient states that she had abd pain in her right upper abd that started last night, she states when she takes a breath it hurts in her upper back. She denies any n/v diarrhea.

## 2025-04-01 NOTE — ED PROVIDER NOTES
HPI   Chief Complaint   Patient presents with   • Abdominal Pain       HPI: []  50-year-old white female history of tobacco use, hypertension comes in with abdominal pain for the last 24 hours.  Pain localized right mid/upper abdomen, with associated nausea and vomiting.  Denies diarrhea constipation denies any fever chills denies any chest pain pressure heaviness denies cough or congestion denies urinary frequency urgency materia denies hematemesis melena or hematochezia no hemoptysis no recent travel hospitalization or antibiotic use.    Past history: Hypertension, tobacco use  Social: Patient is a smoker denies Alcortin A drug abuse  REVIEW OF SYSTEMS:    GENERAL.: No weight loss, fatigue, anorexia, insomnia, fever.    EYES: No vision loss, double vision, drainage, eye pain.    ENT: No pharyngitis, dry mouth.    CARDIOPULMONARY: No chest pain, palpitations, syncope, near syncope. No shortness of breath, cough, hemoptysis.    GI: Positive for abdominal pain, change in bowel habits, melena, hematemesis, hematochezia, nausea, vomiting, diarrhea.    : No discharge, dysuria, frequency, urgency, hematuria.    MS: No limb pain, joint pain, joint swelling.    SKIN: No rashes.    PSYCH: No depression, anxiety, suicidality, homicidality.    Review of systems is otherwise negative unless stated above or in history of present illness.  Social history, family history, allergies reviewed.  PHYSICAL EXAM:    GENERAL: Vitals noted, no distress. Alert and oriented  x 3. Non-toxic.  Appears uncomfortable    EENT: TMs clear. Posterior oropharynx unremarkable. No meningismus. No LAD.     NECK: Supple. Nontender. No midline tenderness.     CARDIAC: Regular, rate, rhythm. No murmurs rubs or gallops. No JVD    PULMONARY: Lungs clear bilaterally with good aeration.  Few scattered rhonchi no wheezing no respiratory distress.     ABDOMEN: Soft, nonsurgical.  Tender right middle quadrant no rebound or guarding negative Mackey sign and  negative McBurney point tenderness no CVA tenderness no inguinal hernias.  No peritoneal signs. Normoactive bowel sounds. No pulsatile masses.     EXTREMITIES: No peripheral edema. Negative Homans bilaterally, no cords.  2+ bounding pulses well-perfused.    SKIN: No rash. Intact.     NEURO: No focal neurologic deficits, NIH score of 0. Cranial nerves normal as tested from II through XII.     MEDICAL DECISION MAKING:  CBC with differential chemistries liver functions lipase lactate normal abdominal CAT scan negative ultrasound right upper quadrant negative.    Treatment in ED in the ED: IV established given IV fluids intravenous Toradol and hydromorphone and Zofran    ED course: Repeat assessment feeling better  Impression: Abdominal pain nonspecific  Plan set MDM: 50-year-old white female comes in without pain with a very negative workup.  At this could be due to nonspecific IBS versus gallbladder dyskinesia, the possibility of bowel obstruction bowel ischemia cholecystitis appendicitis: Pancreatitis is ruled out, patient be discharged home and by supportive care outpatient HIDA scan if indicated close outpatient follow recommended with strict precaution.                Patient History   Past Medical History:   Diagnosis Date   • Arthritis    • Bilateral swelling of feet    • Cancer (Multi) 2010   • GERD (gastroesophageal reflux disease)    • Hypertension    • Nondisplaced fracture of distal phalanx of left little finger, initial encounter for open fracture     Open nondisplaced fracture of distal phalanx of left little finger   • Peptic ulceration    • Personal history of other diseases of the circulatory system     History of hypertension   • Personal history of other diseases of the musculoskeletal system and connective tissue     History of arthritis   • Personal history of other specified conditions     History of ulceration   • Scoliosis    • Shortness of breath     SOB (shortness of breath) on exertion   •  Visual impairment      Past Surgical History:   Procedure Laterality Date   • HYSTERECTOMY     • OTHER SURGICAL HISTORY  2020    Hysterectomy   • OTHER SURGICAL HISTORY  2021    Knee arthroscopy   • TUBAL LIGATION     • WISDOM TOOTH EXTRACTION       Family History   Problem Relation Name Age of Onset   • Arthritis Father Khai quinn    • Diabetes Father Khai quinn    • Hypertension Father Khai quinn    • Hypertension Paternal Grandfather Phani quinn      Social History     Tobacco Use   • Smoking status: Former     Current packs/day: 0.00     Average packs/day: 0.5 packs/day for 36.2 years (18.1 ttl pk-yrs)     Types: Cigarettes     Start date: 3/30/1987     Quit date: 2023     Years since quittin.8   • Smokeless tobacco: Never   Vaping Use   • Vaping status: Every Day   • Substances: Nicotine   • Devices: Pre-filled or refillable cartridge, Refillable tank   Substance Use Topics   • Alcohol use: Not Currently     Comment: Only some special occasions but it's been years   • Drug use: Not Currently     Types: Marijuana, Methamphetamines       Physical Exam   ED Triage Vitals [25 1421]   Temperature Heart Rate Respirations BP   36.4 °C (97.6 °F) 76 20 (!) 145/99      Pulse Ox Temp Source Heart Rate Source Patient Position   98 % Oral Monitor --      BP Location FiO2 (%)     -- --       Physical Exam      ED Course & MDM   ED Course as of 25   175 Patient's CBC with differential chemistries liver functions within normal limits UA negative lactate normal abdominal CAT scan negative ultrasound COVID-negative.  Patient with IV fluids given Zofran hydromorphone and Toradol pain well-controlled with be discharged home, recommended outpatient follow-up with primary doctor and outpatient HIDA scan with strict return precaution [MT]      ED Course User Index  [MT] Bev Patel MD         Diagnoses as of 25 180   Right upper quadrant abdominal pain                  No data recorded     Anabella Coma Scale Score: 15 (04/01/25 1422 : Elba Hernandez RN)                           Medical Decision Making      Procedure  Procedures     Bev Patel MD  04/01/25 1801       Bev Patel MD  04/01/25 1801

## 2025-04-02 LAB — HOLD SPECIMEN: NORMAL
